# Patient Record
Sex: FEMALE | Race: WHITE | NOT HISPANIC OR LATINO | Employment: UNEMPLOYED | URBAN - METROPOLITAN AREA
[De-identification: names, ages, dates, MRNs, and addresses within clinical notes are randomized per-mention and may not be internally consistent; named-entity substitution may affect disease eponyms.]

---

## 2021-06-16 LAB
BILIRUB SERPL-MCNC: NEGATIVE MG/DL
BLOOD URINE, POC: NEGATIVE
CLARITY, POC UA: CLEAR
COLOR, POC UA: YELLOW
GLUCOSE UR QL STRIP: NEGATIVE
KETONES UR QL STRIP: NEGATIVE
LEUKOCYTE EST, POC UA: NORMAL
NITRITE, POC UA: NEGATIVE
PH, POC UA: 8.5
PROTEIN, POC: NEGATIVE
SPECIFIC GRAVITY, POC UA: 1.02
UROBILINOGEN, POC UA: NORMAL

## 2021-06-25 LAB — GLUCOSE 1H P 100 G GLC PO SERPL-MCNC: 75 MG/DL (ref 70–140)

## 2021-07-13 LAB
CLARITY, POC UA: CLEAR
COLOR, POC UA: YELLOW
ERYTHROCYTE [DISTWIDTH] IN BLOOD BY AUTOMATED COUNT: 15.4 % (ref 11–14.5)
GLUCOSE UR QL STRIP: NEGATIVE
HBV SURFACE AG SERPL QL IA: NEGATIVE
HCT VFR BLD AUTO: 29.4 % (ref 36–48)
HGB BLD-MCNC: 9.1 G/DL (ref 11.8–16)
LEUKOCYTE EST, POC UA: NEGATIVE
MCH RBC QN AUTO: 23.5 PG (ref 27–34)
MCHC RBC AUTO-ENTMCNC: 31 G/DL (ref 31–37)
MCV RBC AUTO: 75.8 FL (ref 79–99)
NITRITE, POC UA: NEGATIVE
PLATELET # BLD AUTO: 329 X10(3)/MCL (ref 140–371)
PMV BLD AUTO: 8.5 FL (ref 9.4–12.4)
PROTEIN, POC: NEGATIVE
RBC # BLD AUTO: 3.88 X10(6)/MCL (ref 4–5.1)
RPR SER QL: NORMAL
WBC # SPEC AUTO: 11.4 X10(3)/MCL (ref 4–11.5)

## 2021-07-27 LAB
CLARITY, POC UA: CLEAR
COLOR, POC UA: YELLOW
GLUCOSE UR QL STRIP: NEGATIVE
LEUKOCYTE EST, POC UA: NORMAL
NITRITE, POC UA: NEGATIVE
PROTEIN, POC: NEGATIVE

## 2021-08-10 LAB
ALBUMIN SERPL-MCNC: 3.6 G/DL (ref 3.4–5)
ALBUMIN/GLOB SERPL: 1.3 {RATIO}
ALP SERPL-CCNC: 136 U/L (ref 50–144)
ALT SERPL-CCNC: 9 U/L (ref 1–45)
ANION GAP SERPL CALC-SCNC: 4 MMOL/L (ref 7–16)
AST SERPL-CCNC: 17 U/L (ref 14–36)
BILIRUB SERPL-MCNC: 0.29 MG/DL (ref 0.1–1)
BUN SERPL-MCNC: 6 MG/DL (ref 7–20)
CALCIUM SERPL-MCNC: 8.4 MG/DL (ref 8.4–10.2)
CHLORIDE SERPL-SCNC: 103 MMOL/L (ref 94–110)
CLARITY, POC UA: CLEAR
CO2 SERPL-SCNC: 25 MMOL/L (ref 21–32)
COLOR, POC UA: YELLOW
CREAT SERPL-MCNC: 0.42 MG/DL (ref 0.52–1.04)
CREAT/UREA NIT SERPL: 14.3 (ref 12–20)
ERYTHROCYTE [DISTWIDTH] IN BLOOD BY AUTOMATED COUNT: 16.8 % (ref 11–14.5)
GLOBULIN SER-MCNC: 2.8 G/DL (ref 2–3.9)
GLUCOSE SERPL-MCNC: 78 MGM./DL (ref 70–115)
GLUCOSE UR QL STRIP: NEGATIVE
HCT VFR BLD AUTO: 29.1 % (ref 36–48)
HGB BLD-MCNC: 8.7 G/DL (ref 11.8–16)
LEUKOCYTE EST, POC UA: NORMAL
MCH RBC QN AUTO: 21.5 PG (ref 27–34)
MCHC RBC AUTO-ENTMCNC: 29.9 G/DL (ref 31–37)
MCV RBC AUTO: 72 FL (ref 79–99)
NITRITE, POC UA: NEGATIVE
PLATELET # BLD AUTO: 306 X10(3)/MCL (ref 140–371)
PMV BLD AUTO: 9 FL (ref 9.4–12.4)
POTASSIUM SERPL-SCNC: 3.9 MMOL/L (ref 3.5–5.1)
PROT SERPL-MCNC: 6.4 G/DL (ref 6.3–8.2)
PROTEIN, POC: NEGATIVE
RBC # BLD AUTO: 4.04 X10(6)/MCL (ref 4–5.1)
SODIUM SERPL-SCNC: 132 MMOL/L (ref 135–145)
WBC # SPEC AUTO: 11.4 X10(3)/MCL (ref 4–11.5)

## 2021-08-16 LAB
CLARITY, POC UA: NORMAL
COLOR, POC UA: NORMAL
GLUCOSE UR QL STRIP: NEGATIVE
LEUKOCYTE EST, POC UA: NEGATIVE
NITRITE, POC UA: NEGATIVE
PROTEIN, POC: NORMAL

## 2021-08-24 LAB
CLARITY, POC UA: CLEAR
COLOR, POC UA: YELLOW
GLUCOSE UR QL STRIP: NEGATIVE
LEUKOCYTE EST, POC UA: NEGATIVE
NITRITE, POC UA: NEGATIVE
PROTEIN, POC: NEGATIVE

## 2021-09-20 LAB
AMPHET UR QL SCN: ABNORMAL
BARBITURATE SCN PRESENT UR: ABNORMAL
BENZODIAZ UR QL SCN: ABNORMAL
CANNABINOIDS UR QL SCN: ABNORMAL
COCAINE UR QL SCN: ABNORMAL
METHADONE UR QL SCN: ABNORMAL
OPIATES UR QL SCN: ABNORMAL
PCP UR QL: ABNORMAL

## 2021-09-30 LAB
ANISOCYTOSIS BLD QL SMEAR: ABNORMAL
BASOPHILS # BLD AUTO: 0.06 X10(3)/MCL (ref 0.01–0.08)
BASOPHILS NFR BLD AUTO: 0.7 % (ref 0.1–1.2)
EOSINOPHIL # BLD AUTO: 0.12 X10(3)/MCL (ref 0.04–0.36)
EOSINOPHIL NFR BLD AUTO: 1.4 % (ref 0.7–7)
ERYTHROCYTE [DISTWIDTH] IN BLOOD BY AUTOMATED COUNT: 19.9 % (ref 11–14.5)
HCT VFR BLD AUTO: 36 % (ref 36–48)
HGB BLD-MCNC: 10.2 G/DL (ref 11.8–16)
HYPOCHROMIA BLD QL SMEAR: ABNORMAL
IMM GRANULOCYTES # BLD AUTO: 0.03 X10E3/UL (ref 0–0.03)
IMM GRANULOCYTES NFR BLD AUTO: 0.3 % (ref 0–0.5)
LYMPHOCYTES # BLD AUTO: 2.21 X10(3)/MCL (ref 1.16–3.74)
LYMPHOCYTES NFR BLD AUTO: 25.1 % (ref 20–55)
MACROCYTES BLD QL SMEAR: ABNORMAL
MCH RBC QN AUTO: 20.7 PG (ref 27–34)
MCHC RBC AUTO-ENTMCNC: 28.3 G/DL (ref 31–37)
MCV RBC AUTO: 73.2 FL (ref 79–99)
MICROCYTES BLD QL SMEAR: ABNORMAL
MONOCYTES # BLD AUTO: 0.35 X10(3)/MCL (ref 0.24–0.36)
MONOCYTES NFR BLD AUTO: 4 % (ref 4.7–12.5)
NEUTROPHILS # BLD AUTO: 6.04 X10(3)/MCL (ref 1.56–6.13)
NEUTROPHILS NFR BLD AUTO: 68.5 % (ref 37–73)
PLATELET # BLD AUTO: 423 X10(3)/MCL (ref 140–371)
PMV BLD AUTO: 8.3 FL (ref 9.4–12.4)
POIKILOCYTOSIS BLD QL SMEAR: ABNORMAL
RBC # BLD AUTO: 4.92 X10(6)/MCL (ref 4–5.1)
RBC MORPH BLD: ABNORMAL
WBC # SPEC AUTO: 8.8 X10(3)/MCL (ref 4–11.5)

## 2021-11-03 LAB — POC BETA-HCG (QUAL): NEGATIVE

## 2022-04-10 ENCOUNTER — HISTORICAL (OUTPATIENT)
Dept: ADMINISTRATIVE | Facility: HOSPITAL | Age: 27
End: 2022-04-10

## 2022-04-30 VITALS
SYSTOLIC BLOOD PRESSURE: 102 MMHG | BODY MASS INDEX: 28.26 KG/M2 | DIASTOLIC BLOOD PRESSURE: 68 MMHG | WEIGHT: 140.19 LBS | HEIGHT: 59 IN

## 2022-05-03 ENCOUNTER — HISTORICAL (OUTPATIENT)
Dept: ADMINISTRATIVE | Facility: HOSPITAL | Age: 27
End: 2022-05-03

## 2022-05-03 NOTE — HISTORICAL [facility]ER
This is a historical note converted from [facility]. Formatting and pictures may have been removed.  Please reference Ismael for original formatting and attached multimedia. Chief Complaint  New ob, patient it 25w3d. Previously saw Dr. Wilkins in [place]. Patient was given due date of 21.  Patient reports her blood type A Negative. Pt reports doing Quad Screen. Needs orders for cbc/rpr/Jessica.  History of Present Illness  26yo WF  presents as New OB transfer from Dr. Wilkins in [place]. Patient states given EMILY 21. Reports h/o  delivery. Denies any complaints today.  LMP/EGA/EMILY  Gestational Age (EGA) and EMILY?? ? * Note: EGA calculated as of 2021  ?  EMILY:?2021???EGA*:?25 weeks 3 days ? ? ? ? ? ?Type:?Authoritative??????Method Date:?2020  ?  ?? ? ?Method:?Last Menstrual Period?(2020)  ?? ? ?Confirmation:?Confirmed  ?? ? ?Description:??Date Approximate  ?? ? ?Comments:?--  ?? ? ?Entered by:?Guido Mckinley on 2021?  ?  Other EMILY Calculations for this Pregnancy:  ?? ? ?No additional EMILY calculations have been recorded for this pregnancy  Gynecological History  Last Menstrual Period: 21  Menstrual Status Intake: Menarcheal  STIs/STDs: Yes  Abnormal Pap: No  Current Birth Control Method: Pregnant  HPV Vaccine: No  Dyspareunia: No  Postcoital Bleeding: No  Dysuria: No  Additional GYN Information: Last pap over a year ago, wnl per patient. Hx of Trich.  Discharge OB: none reported  Breast CA Hx: No  Sexual Problems OB: none reported  Urinary Incontinence: none reported  Sexually Active: Yes  Review of Systems  General/Constitutional:?  Chills?denies. Fatigue/weakness?denies?. Fever?denies?. Night sweats?denies?.  Skin:  Rash?denies.  Respiratory:  Cough?denies?. Hemoptysis?denies?. SOB?denies?. Sputum production?denies?. Wheezing?denies?.  Cardiovascular:  Chest pain?denies. Dizziness?denies. Palpitations?denies. Swelling in  hands/feet?denies.  Breast:  Changes in skin of nipple or breast?denies?. Breast lump?denies. Breast pain?denies. Nipple discharge?denies?.  Gastrointestinal:  Abdominal pain?denies?. Blood in stool?denies?. Constipation?denies?. Diarrhea?denies?. Heartburn?denies?. Nausea?denies?. Vomiting?denies  Genitourinary:  Incontinence?denies?. Blood in urine?denies. Frequent urination?denies?. Painful urination?denies.  Gynecologic:  Irregular menses?denies. Heavy bleeding?denies. Painful menses?denies. Vaginal discharge/ Odor?denies?. Vaginal lesion/pain?denies. ?Pelvic pain?denies?. Decreased libido?denies. Vulvar lesion/ulcer?denies?. Prolapse of genital organs?denies?. Painful intercourse?denies?.  Menopausal:  Hot flushes?denies. Vaginal dryness?denies.  Musculoskeletal:  Joint/nasra pain?denies. Decreased ROM?denies. Muscle aches?denies. Swollen joints?denies?. Weakness?denies.  Neurologic:  Confusion?denies. Trouble walking?denies. Trouble with balance?denies?Balance difficulty?denies. Gait abnormalities?denies. Headache?denies. Tingling/Numbness?denies.  Psychiatric:  Mood lability?denies.?Anxiety or panic attacks?denies. Depressed mood?denies. Suicidal thoughts?denies.?  Physical Exam  Vitals & Measurements  T:?36.5? ?C (Temporal Artery)? BP:?128/64?  HT:?149.00?cm? WT:?61.900?kg? BMI:?27.88? LMP:?12/20/2021 00:00 CST?  Chaperone: present  ?   General appearance: - healthy, well-nourished and well-developed  ?   Psychiatric: Orientation to time, place and person. Normal mood and affect and active, alert  ?   Skin:  Appearance: no rashes or lesions  ?   Neck:  Neck: supple, FROM, trachea midline. and no masses  Thyroid: no enlargement or nodules and non-tender.  ?  ?   Cardiovascular:  Auscultation: RRR and no murmur  Peripheral Vascular: no varicosities, LLE edema, RLE edema, calf tenderness, and palpable cord and pedal pulses intact  ?   Lungs:  Respiratory effort: no intercostal retractions or accessory muscle  usage  Auscultation: no wheezing, rales/crackles, or rhonchi and clear to auscultation  ?  Breast:  Inspection/Palpation: no tenderness, discrete/distinct masses, skin changes, or abnormal secretions. Nipple appearance normal.  ?  Abdomen:  Auscultation/Inspection/Palpation: no hepatomegaly, splenomegaly, masses , tenderness? or CVA tenderness and soft, non distended bowel sounds preset  Hernia: no palpable hernias  ?  Female Genitalia:  Vulva: no masses,?tenderness or?lesions  Bladder/Urethra: no urethral discharge or mass, normal meatus, bladder non-distended  Vagina: no tenderness, erythema, cystocele, rectocele, abnormal vaginal discharge, or vesicle(s) or ulcers  Cervix: no discharge , no cervical lacerations noted or motion tenderness and grossly normal  Uterus: normal size and shape and midline, non-tender, and no uterine prolapse.  Adnexa/Parametria: no parametrial tenderness or mass, no adnexal tenderness or ovarian mass  ?  Lymph Nodes:  Palpation: non tender submandibular nodes, axillary nodes, or inguinal nodes  ?  Rectal Exam:  Rectum: normal perianal skin  Assessment/Plan  1.?Uterine scar from previous  delivery, antepartum?O34.219  ?- H/o CS  ?  ?- Baby weighed 9# at delivery  ?  ?- Discussed TOLAC vs repeat CS and maternal fetal risks  ?  ?- Will make delivery plan at preadmit appointment  2.?Rh negative, antepartum?O26.899  ?- Will give orders for Rhogam/CBC/RPR on RTC  3.?25 weeks gestation of pregnancy?Z3A.25  ?- We discussed diet and supplements and modifiable risk factors.  ?   - Patient advised to continue moderate physical activity.  ?   - Patient will report unusual headaches, visual disturbances, pelvic pain or cramping, vaginal bleeding, rupture of membranes, extreme swelling in hands or face, diminished urine volume, any prolonged illness or infection, or persistent symptoms of labor.  ?   - Discussed with patient on?the uncertainty of?COVID 19?in relation  to?pregnancy?complications with patient and unborn fetus.? Advised to CDC guidelines of social distancing ,patient to stay home as much as possible, and?limit contact with others.? Travel restrictions discussed. ?Patient to call office if she has?a temperature over 100.4?cough shortness of breath?or GI symptoms. Patient educated if?she feels she is in an emergency to call 911.?  ?  - Pap GC/CZ/TV  ?  ?- MARTY PNL/US from Dr. Wilkins  ?  ?- RTC in 2 weeks for OB check  ?  ?- Rhogam/CBC/RPR on RTC   OB History  Pregnancy History???(1,0,0,1)?? ??  Pregnancy # 1  Baby 1?????????????Outcome Date:?2019????? Outcome:?Live Birth  ???Outcome or Result:?  ???Gender:?Male????????Gest Age:?40 weeks 5 days ??????Wt:??4508 g  ???Hospital:?--????????[facility]:?--  ???Kevin Name:?--?????Babys Father:?--  Problem List/Past Medical History  Ongoing  Pregnant  Rh negative, antepartum  Uterine scar from previous  delivery, antepartum  Historical  Pregnant  Procedure/Surgical History  CS -  section (2019)   Medications  Reglan 10 mg oral tablet, 10 mg= 1 tab(s), Oral, QIDACHS  Allergies  No Known Allergies  No Known Medication Allergies  Social History  Abuse/Neglect  No, 2021  Alcohol  Never, 2021  Sexual  Sexually active: Yes. Sexual orientation: Straight or heterosexual. Gender Identity Identifies as female., 2021  Substance Use  Never, 2021  Tobacco  Former smoker, quit more than 30 days ago, Cigarettes, No, 2021  Family History  Family history is negative  Immunizations  Vaccine Date Status   tetanus/diphtheria/pertussis, acel(Tdap) 10/25/2019 Recorded   Health Maintenance  Health Maintenance  ???Pending?(in the next year)  ??? ??OverDue  ??? ? ? ?Alcohol Misuse Screening due??21??and every 1??year(s)  ??? ??Due?  ??? ? ? ?ADL Screening due??21??and every 1??year(s)  ??? ??Due In Future?  ??? ? ? ?Obesity Screening not due until??22??and  every 1??year(s)  ???Satisfied?(in the past 1 year)  ??? ??Satisfied?  ??? ? ? ?Blood Pressure Screening on??06/16/21.??Satisfied by Guido Mckinley  ??? ? ? ?Body Mass Index Check on??06/16/21.??Satisfied by Guido Mckinley  ??? ? ? ?Obesity Screening on??06/16/21.??Satisfied by Gudio Mckinley  ?

## 2022-09-21 ENCOUNTER — HISTORICAL (OUTPATIENT)
Dept: ADMINISTRATIVE | Facility: HOSPITAL | Age: 27
End: 2022-09-21

## 2023-11-06 ENCOUNTER — SOCIAL WORK (OUTPATIENT)
Dept: ADMINISTRATIVE | Facility: OTHER | Age: 28
End: 2023-11-06

## 2023-11-06 NOTE — PROGRESS NOTES
SW met with pt regarding initial OB assessment. Pt stated this is her 3rd pregnancy/0-miscarriage. Pt stated lives with her /children-4,2 and able to perform ADL's independently. Pt stated support system is her /[facility]. Pt stated has medicaid([place]). Pt stated does not have WIC. Pt stated is going to breastfeed. SW provide pt with information on other community resources.SW faxed and scanned pt's notification of pregnancy into epic.  No other needs identified at this time.     ROSA Malcolm  Desk:[phone]  Fax:[phone]

## 2023-11-20 PROBLEM — Z34.92 ENCOUNTER FOR SUPERVISION OF NORMAL PREGNANCY IN SECOND TRIMESTER: Status: ACTIVE | Noted: 2023-11-20

## 2023-11-20 PROBLEM — Z98.891 HISTORY OF 2 CESAREAN SECTIONS: Status: ACTIVE | Noted: 2023-11-20

## 2024-01-07 PROBLEM — O26.899 RH NEGATIVE STATE IN ANTEPARTUM PERIOD: Status: ACTIVE | Noted: 2024-01-07

## 2024-01-07 PROBLEM — J10.1 INFLUENZA A: Status: ACTIVE | Noted: 2024-01-07

## 2024-01-07 PROBLEM — B37.31 YEAST VAGINITIS: Status: ACTIVE | Noted: 2024-01-07

## 2024-01-07 PROBLEM — R21 RASH: Status: ACTIVE | Noted: 2024-01-07

## 2024-01-07 PROBLEM — I95.9 HYPOTENSION: Status: ACTIVE | Noted: 2024-01-07

## 2024-01-07 PROBLEM — A41.9 SEPSIS: Status: ACTIVE | Noted: 2024-01-07

## 2024-01-07 PROBLEM — Z67.91 RH NEGATIVE STATE IN ANTEPARTUM PERIOD: Status: ACTIVE | Noted: 2024-01-07

## 2024-01-07 PROBLEM — O99.013 ANEMIA AFFECTING PREGNANCY IN THIRD TRIMESTER: Status: ACTIVE | Noted: 2024-01-07

## 2024-01-07 PROBLEM — O98.513: Status: ACTIVE | Noted: 2024-01-07

## 2024-01-08 PROBLEM — I95.9 HYPOTENSION: Status: RESOLVED | Noted: 2024-01-07 | Resolved: 2024-01-08

## 2024-02-05 PROBLEM — Z34.83 ENCOUNTER FOR SUPERVISION OF OTHER NORMAL PREGNANCY, THIRD TRIMESTER: Status: ACTIVE | Noted: 2023-11-20

## 2024-02-26 PROBLEM — R21 RASH: Status: RESOLVED | Noted: 2024-01-07 | Resolved: 2024-02-26

## 2024-02-26 PROBLEM — O09.899 RUBELLA NON-IMMUNE STATUS, ANTEPARTUM: Status: ACTIVE | Noted: 2024-02-26

## 2024-02-26 PROBLEM — J10.1 INFLUENZA A: Status: RESOLVED | Noted: 2024-01-07 | Resolved: 2024-02-26

## 2024-02-26 PROBLEM — R87.810 HUMAN PAPILLOMAVIRUS (HPV) TYPE 16 DNA DETECTED IN CERVICAL SPECIMEN: Status: ACTIVE | Noted: 2024-02-26

## 2024-02-26 PROBLEM — O98.513: Status: RESOLVED | Noted: 2024-01-07 | Resolved: 2024-02-26

## 2024-02-26 PROBLEM — Z28.39 RUBELLA NON-IMMUNE STATUS, ANTEPARTUM: Status: ACTIVE | Noted: 2024-02-26

## 2024-02-26 PROBLEM — B37.31 YEAST VAGINITIS: Status: RESOLVED | Noted: 2024-01-07 | Resolved: 2024-02-26

## 2024-02-26 PROBLEM — A41.9 SEPSIS: Status: RESOLVED | Noted: 2024-01-07 | Resolved: 2024-02-26

## 2024-02-27 PROBLEM — O99.013 ANEMIA AFFECTING PREGNANCY IN THIRD TRIMESTER: Status: RESOLVED | Noted: 2024-01-07 | Resolved: 2024-02-27

## 2024-02-27 PROBLEM — Z98.891 HISTORY OF 2 CESAREAN SECTIONS: Status: RESOLVED | Noted: 2023-11-20 | Resolved: 2024-02-27

## 2024-03-04 PROBLEM — R03.0 SINGLE EPISODE OF ELEVATED BLOOD PRESSURE: Status: ACTIVE | Noted: 2024-03-04
